# Patient Record
Sex: FEMALE | Race: ASIAN | NOT HISPANIC OR LATINO | Employment: UNEMPLOYED | URBAN - METROPOLITAN AREA
[De-identification: names, ages, dates, MRNs, and addresses within clinical notes are randomized per-mention and may not be internally consistent; named-entity substitution may affect disease eponyms.]

---

## 2021-07-12 ENCOUNTER — HOSPITAL ENCOUNTER (EMERGENCY)
Facility: HOSPITAL | Age: 56
Discharge: HOME | End: 2021-07-12
Attending: STUDENT IN AN ORGANIZED HEALTH CARE EDUCATION/TRAINING PROGRAM | Admitting: STUDENT IN AN ORGANIZED HEALTH CARE EDUCATION/TRAINING PROGRAM
Payer: COMMERCIAL

## 2021-07-12 ENCOUNTER — HOSPITAL ENCOUNTER (EMERGENCY)
Facility: HOSPITAL | Age: 56
End: 2021-07-12

## 2021-07-12 VITALS
TEMPERATURE: 98.3 F | DIASTOLIC BLOOD PRESSURE: 99 MMHG | RESPIRATION RATE: 16 BRPM | WEIGHT: 150 LBS | SYSTOLIC BLOOD PRESSURE: 177 MMHG | HEART RATE: 68 BPM | OXYGEN SATURATION: 99 %

## 2021-07-12 DIAGNOSIS — E87.6 HYPOKALEMIA: ICD-10-CM

## 2021-07-12 DIAGNOSIS — K29.00 ACUTE GASTRITIS WITHOUT HEMORRHAGE, UNSPECIFIED GASTRITIS TYPE: ICD-10-CM

## 2021-07-12 LAB
ALBUMIN SERPL-MCNC: 4.1 G/DL (ref 3.5–5)
ALP SERPL-CCNC: 95 U/L (ref 45–120)
ALT SERPL W P-5'-P-CCNC: 25 U/L (ref 0–45)
ANION GAP SERPL CALCULATED.3IONS-SCNC: 8 MMOL/L (ref 5–18)
AST SERPL W P-5'-P-CCNC: 23 U/L (ref 0–40)
BASOPHILS # BLD AUTO: 0.1 10E3/UL (ref 0–0.2)
BASOPHILS NFR BLD AUTO: 1 %
BILIRUB SERPL-MCNC: 0.7 MG/DL (ref 0–1)
BUN SERPL-MCNC: 18 MG/DL (ref 8–22)
CALCIUM SERPL-MCNC: 9.7 MG/DL (ref 8.5–10.5)
CHLORIDE BLD-SCNC: 105 MMOL/L (ref 98–107)
CO2 SERPL-SCNC: 28 MMOL/L (ref 22–31)
CREAT SERPL-MCNC: 0.81 MG/DL (ref 0.6–1.1)
EOSINOPHIL # BLD AUTO: 0.2 10E3/UL (ref 0–0.7)
EOSINOPHIL NFR BLD AUTO: 2 %
ERYTHROCYTE [DISTWIDTH] IN BLOOD BY AUTOMATED COUNT: 12.4 % (ref 10–15)
GFR SERPL CREATININE-BSD FRML MDRD: 81 ML/MIN/1.73M2
GLUCOSE BLD-MCNC: 93 MG/DL (ref 70–125)
HCT VFR BLD AUTO: 40.8 % (ref 35–47)
HGB BLD-MCNC: 13.2 G/DL (ref 11.7–15.7)
IMM GRANULOCYTES # BLD: 0 10E3/UL
IMM GRANULOCYTES NFR BLD: 0 %
LIPASE SERPL-CCNC: 22 U/L (ref 0–52)
LYMPHOCYTES # BLD AUTO: 1.8 10E3/UL (ref 0.8–5.3)
LYMPHOCYTES NFR BLD AUTO: 23 %
MAGNESIUM SERPL-MCNC: 2 MG/DL (ref 1.8–2.6)
MCH RBC QN AUTO: 27.7 PG (ref 26.5–33)
MCHC RBC AUTO-ENTMCNC: 32.4 G/DL (ref 31.5–36.5)
MCV RBC AUTO: 86 FL (ref 78–100)
MONOCYTES # BLD AUTO: 0.5 10E3/UL (ref 0–1.3)
MONOCYTES NFR BLD AUTO: 6 %
NEUTROPHILS # BLD AUTO: 5.3 10E3/UL (ref 1.6–8.3)
NEUTROPHILS NFR BLD AUTO: 68 %
NRBC # BLD AUTO: 0 10E3/UL
NRBC BLD AUTO-RTO: 0 /100
PLATELET # BLD AUTO: 219 10E3/UL (ref 150–450)
POTASSIUM BLD-SCNC: 3 MMOL/L (ref 3.5–5)
PROT SERPL-MCNC: 7.8 G/DL (ref 6–8)
RBC # BLD AUTO: 4.77 10E6/UL (ref 3.8–5.2)
SODIUM SERPL-SCNC: 141 MMOL/L (ref 136–145)
TROPONIN I SERPL-MCNC: <0.01 NG/ML (ref 0–0.29)
WBC # BLD AUTO: 7.8 10E3/UL (ref 4–11)

## 2021-07-12 PROCEDURE — 99284 EMERGENCY DEPT VISIT MOD MDM: CPT

## 2021-07-12 PROCEDURE — 36592 COLLECT BLOOD FROM PICC: CPT | Performed by: EMERGENCY MEDICINE

## 2021-07-12 PROCEDURE — 83735 ASSAY OF MAGNESIUM: CPT | Performed by: EMERGENCY MEDICINE

## 2021-07-12 PROCEDURE — 83690 ASSAY OF LIPASE: CPT | Performed by: EMERGENCY MEDICINE

## 2021-07-12 PROCEDURE — 85025 COMPLETE CBC W/AUTO DIFF WBC: CPT | Performed by: EMERGENCY MEDICINE

## 2021-07-12 PROCEDURE — 93005 ELECTROCARDIOGRAM TRACING: CPT | Performed by: STUDENT IN AN ORGANIZED HEALTH CARE EDUCATION/TRAINING PROGRAM

## 2021-07-12 PROCEDURE — 80053 COMPREHEN METABOLIC PANEL: CPT | Performed by: EMERGENCY MEDICINE

## 2021-07-12 PROCEDURE — 250N000013 HC RX MED GY IP 250 OP 250 PS 637: Performed by: STUDENT IN AN ORGANIZED HEALTH CARE EDUCATION/TRAINING PROGRAM

## 2021-07-12 PROCEDURE — 84484 ASSAY OF TROPONIN QUANT: CPT | Performed by: STUDENT IN AN ORGANIZED HEALTH CARE EDUCATION/TRAINING PROGRAM

## 2021-07-12 PROCEDURE — 250N000009 HC RX 250: Performed by: STUDENT IN AN ORGANIZED HEALTH CARE EDUCATION/TRAINING PROGRAM

## 2021-07-12 RX ORDER — FAMOTIDINE 20 MG/1
20 TABLET, FILM COATED ORAL 2 TIMES DAILY
Qty: 60 TABLET | Refills: 0 | Status: SHIPPED | OUTPATIENT
Start: 2021-07-12 | End: 2021-08-11

## 2021-07-12 RX ADMIN — LIDOCAINE HYDROCHLORIDE 30 ML: 20 SOLUTION ORAL; TOPICAL at 14:52

## 2021-07-12 ASSESSMENT — ENCOUNTER SYMPTOMS
NAUSEA: 0
SHORTNESS OF BREATH: 0
BLOOD IN STOOL: 0
ABDOMINAL PAIN: 1
VOMITING: 0

## 2021-07-12 NOTE — ED NOTES
Patient is resting comfortably in bed.  She states that her pain is gone now and she feels good.  Call light is within reach as she awaits provider reevaluation.

## 2021-07-12 NOTE — ED TRIAGE NOTES
"Patient presents here for evaluation of upper midline abdominal pain that has persisted for the past week. She describes it as a \"cooling sense\". The pain is worse with palpation.   "

## 2021-07-12 NOTE — DISCHARGE INSTRUCTIONS
You can also use Maalox as needed which is available over the counter. Your potassium was a little low today. You should eat some potassium containing foods such as bananas.     Return to the ED for severe pain, vomiting, fevers, or any other new/concerning symptoms.

## 2021-07-12 NOTE — ED NOTES
"Pt reports constant  epigastric pain x 8 days. BP elevated , pt states that she \"forgot\" to take her BP meds x 2 days.  "

## 2021-07-12 NOTE — ED PROVIDER NOTES
"  Emergency Department Encounter     Evaluation Date & Time:   2021  2:03 PM    CHIEF COMPLAINT:  Abdominal Pain      Triage Note:Patient presents here for evaluation of upper midline abdominal pain that has persisted for the past week. She describes it as a \"cooling sense\". The pain is worse with palpation.     Impression and Plan     ED COURSE & MEDICAL DECISION MAKIN year old old female who presents to the ED for evaluation of upper abdominal pain.   History and physical examination as documented. Vital signs reassuring.  Patient has pain in the epigastrium.  She has no focal right upper quadrant pain or tenderness.  Her abdomen is completely nontender and there is no indication for CT imaging.  Labs were ordered in triage.  Consideration for gastritis/PUD, pancreatitis, cholecystitis/cholelithiasis/choledocholithiasis, ACS, among other etiologies.  Her labs are all reassuring.  Lipase is normal.  LFTs with no obstructive pattern. Troponin is normal. EKG with no ST elevation or depression. I don't think this is ACS. She had complete relief with GI cocktail and would like to be discharged.  Presentation is consistent with gastritis. We will start her on Pepcid and advise Maalox as needed. Given f/u with [facility]. Given return precautions and discharged in stable condition.     Additionally has mild hypokalemia at 3.0. I advised eating potassium containing foods.    2:26 PM  I introduced myself to the patient.   3:39 PM I reassessed the patient. Pain is gone after GI cocktail.     At the conclusion of the encounter I discussed the results of all the tests and the disposition. The questions were answered. The patient or family acknowledged understanding and was agreeable with the care plan.    FINAL IMPRESSION:    ICD-10-CM    1. Acute gastritis without hemorrhage, unspecified gastritis type  K29.00    2. Hypokalemia  E87.6        0 minutes of critical care time    Reassessments & Consults       MEDICATIONS " GIVEN IN THE EMERGENCY DEPARTMENT:  Medications   lidocaine (XYLOCAINE) 2 % 15 mL, alum & mag hydroxide-simethicone (MAALOX) 15 mL GI Cocktail (30 mLs Oral Given 7/12/21 1452)       NEW PRESCRIPTIONS STARTED AT TODAY'S ED VISIT:  New Prescriptions    FAMOTIDINE (PEPCID) 20 MG TABLET    Take 1 tablet (20 mg) by mouth 2 times daily       HPI     HPI     Anat Coreas is a 56 year old female with no pertinent history who presents to this ED via walk-in for evaluation of chest pain.    Patient was interpreted by daughter. Language: Hmong     Patient reports upper midline abdominal pain started seven to eight days ago. Pain has been constant. Any pressure on her upper mid abdomen provokes the pain. Patient reports a history of high blood pressure. Patient has not taken blood pressure medication for the past two days.     Patient reports bloating. Patient denies any nausea, vomiting, irregular bowel movements, any urinary symptoms, blood in stool, difficulty breathing, and any chest pain.   Patient has had no history of this pain. Patient has no history of heart attacks, diabetes, or stomach ulcers.     No other complaints at this time    REVIEW OF SYSTEMS:  Review of Systems   Respiratory: Negative for shortness of breath.    Cardiovascular: Negative for chest pain.   Gastrointestinal: Positive for abdominal pain (Upper mid abdomen). Negative for blood in stool, nausea and vomiting.        Positive for bloating. Negative for irregular bowel movements.    Genitourinary: Negative.    All other systems reviewed and are negative.    Medical History     History of hypertension  No past abdominal surgeries  No pertinent family history.     Non-smoker  No frequent alcohol use.    Physical Exam     First Vitals:  Patient Vitals for the past 24 hrs:   BP Temp Pulse Resp SpO2 Weight   07/12/21 1620 (!) 177/99 -- 68 16 99 % --   07/12/21 1532 (!) 187/108 -- 61 20 98 % --   07/12/21 1416 (!) 200/95 -- 59 16 99 % --   07/12/21 1202 (!)  197/110 98.3  F (36.8  C) 67 15 99 % 68 kg (150 lb)       PHYSICAL EXAM:   General: NAD.  HEENT: No external signs of trauma. No scleral icterus. Oropharynx clear and moist. Regular.  Chest/Pulm: No tenderness to palpation. Lungs clear to auscultation bilaterally.  CV: Regular rate and rhythm without murmurs.  Abdomen: Soft and non-distended without tenderness to palpation.  MSK/Extremities: Actively moving all four extremities. No pedal edema.  Skin: No visible rashes  Neuro: Alert and conversant.   Psych: Behavior normal.    Results     LAB:  All pertinent labs reviewed and interpreted  Labs Ordered and Resulted from Time of ED Arrival Up to the Time of Departure from the ED   COMPREHENSIVE METABOLIC PANEL - Abnormal; Notable for the following components:       Result Value    Potassium 3.0 (*)     All other components within normal limits   LIPASE - Normal   MAGNESIUM - Normal   TROPONIN I - Normal   CBC WITH PLATELETS AND DIFFERENTIAL   CBC WITH PLATELETS & DIFFERENTIAL    Narrative:     The following orders were created for panel order CBC with platelets differential.  Procedure                               Abnormality         Status                     ---------                               -----------         ------                     CBC with platelets and d...[[id number]]                      Final result                 Please view results for these tests on the individual orders.       RADIOLOGY:  No orders to display              ECG:  Performed at: [facility]     Impression: EKG shows sinus rhythm of a normal rate. No ST elevation or depression. Normal intervals.     Rate: 57 BPM  Rhythm: Normal Rate  IA Interval: 216 ms  QRS Interval: 98 ms  QTc Interval: 460/447 ms      I have independently reviewed and interpreted the EKS(s) documented above    PROCEDURES:  None    [facility] System Documentation     Supriya Chang MD  Emergency Medicine  [facility] EMERGENCY  DEPARTMENT       Supriya Chang MD  07/12/21 5864

## 2021-07-13 LAB — INTERPRETATION ECG - MUSE: NORMAL

## 2022-06-10 ENCOUNTER — HOSPITAL ENCOUNTER (EMERGENCY)
Facility: HOSPITAL | Age: 57
Discharge: HOME | End: 2022-06-10
Admitting: PHYSICIAN ASSISTANT
Payer: COMMERCIAL

## 2022-06-10 ENCOUNTER — APPOINTMENT (OUTPATIENT)
Dept: RADIOLOGY | Facility: HOSPITAL | Age: 57
End: 2022-06-10
Attending: EMERGENCY MEDICINE
Payer: COMMERCIAL

## 2022-06-10 ENCOUNTER — APPOINTMENT (OUTPATIENT)
Dept: CT IMAGING | Facility: HOSPITAL | Age: 57
End: 2022-06-10
Payer: COMMERCIAL

## 2022-06-10 VITALS
RESPIRATION RATE: 16 BRPM | TEMPERATURE: 98 F | DIASTOLIC BLOOD PRESSURE: 95 MMHG | SYSTOLIC BLOOD PRESSURE: 165 MMHG | OXYGEN SATURATION: 97 % | HEART RATE: 69 BPM

## 2022-06-10 DIAGNOSIS — R31.9 HEMATURIA: ICD-10-CM

## 2022-06-10 DIAGNOSIS — N20.0 NEPHROLITHIASIS: ICD-10-CM

## 2022-06-10 DIAGNOSIS — R03.0 ELEVATED BLOOD PRESSURE READING WITHOUT DIAGNOSIS OF HYPERTENSION: ICD-10-CM

## 2022-06-10 DIAGNOSIS — M62.838 MUSCLE SPASM: ICD-10-CM

## 2022-06-10 DIAGNOSIS — M54.50 BILATERAL LOW BACK PAIN WITHOUT SCIATICA: ICD-10-CM

## 2022-06-10 LAB
ALBUMIN UR-MCNC: 50 MG/DL
APPEARANCE UR: CLEAR
BILIRUB UR QL STRIP: NEGATIVE
CAOX CRY #/AREA URNS HPF: ABNORMAL /HPF
COLOR UR AUTO: YELLOW
GLUCOSE UR STRIP-MCNC: NEGATIVE MG/DL
HGB UR QL STRIP: ABNORMAL
HYALINE CASTS: 3 /LPF
KETONES UR STRIP-MCNC: NEGATIVE MG/DL
LEUKOCYTE ESTERASE UR QL STRIP: ABNORMAL
MUCOUS THREADS #/AREA URNS LPF: PRESENT /LPF
NITRATE UR QL: NEGATIVE
PH UR STRIP: 6 [PH] (ref 5–7)
RBC URINE: 7 /HPF
SP GR UR STRIP: 1.02 (ref 1–1.03)
SQUAMOUS EPITHELIAL: 2 /HPF
UROBILINOGEN UR STRIP-MCNC: <2 MG/DL
WBC URINE: 3 /HPF

## 2022-06-10 PROCEDURE — 81001 URINALYSIS AUTO W/SCOPE: CPT | Performed by: EMERGENCY MEDICINE

## 2022-06-10 PROCEDURE — 74176 CT ABD & PELVIS W/O CONTRAST: CPT

## 2022-06-10 PROCEDURE — 72100 X-RAY EXAM L-S SPINE 2/3 VWS: CPT

## 2022-06-10 PROCEDURE — 99285 EMERGENCY DEPT VISIT HI MDM: CPT | Mod: 25

## 2022-06-10 RX ORDER — KETOROLAC TROMETHAMINE 30 MG/ML
15 INJECTION, SOLUTION INTRAMUSCULAR; INTRAVENOUS ONCE
Status: DISCONTINUED | OUTPATIENT
Start: 2022-06-10 | End: 2022-06-10 | Stop reason: HOSPADM

## 2022-06-10 RX ORDER — CYCLOBENZAPRINE HCL 10 MG
10 TABLET ORAL 3 TIMES DAILY PRN
Qty: 15 TABLET | Refills: 0 | Status: SHIPPED | OUTPATIENT
Start: 2022-06-10

## 2022-06-10 RX ORDER — CYCLOBENZAPRINE HCL 10 MG
10 TABLET ORAL 3 TIMES DAILY PRN
Qty: 15 TABLET | Refills: 0 | Status: SHIPPED | OUTPATIENT
Start: 2022-06-10 | End: 2022-06-10

## 2022-06-10 ASSESSMENT — ENCOUNTER SYMPTOMS
FLANK PAIN: 0
ABDOMINAL PAIN: 1
HEMATURIA: 0
NAUSEA: 1
CONSTIPATION: 0
FREQUENCY: 0
DYSURIA: 0
DIFFICULTY URINATING: 0
BACK PAIN: 1
DIARRHEA: 0

## 2022-06-10 NOTE — ED NOTES
ED Provider In Triage Note  [facility]  Encounter Date: Jairo 10, 2022    Chief Complaint   Patient presents with     Back Pain       Brief HPI:   Anat Coreas is a 57 year old female presenting to the Emergency Department with a chief complaint of LBP/R flank pain for 8-10 days. No dysuria. No injuries. Has not taken anything for pain  Brief Physical Exam:  BP (!) 180/99   Pulse 77   Temp 98  F (36.7  C) (Temporal)   Resp 16   SpO2 98%   General: Non-toxic appearing  HEENT: Atraumatic  Resp: No respiratory distress  Abdomen: Non-peritoneal. Mild R CVAT and mid-line upper lumbar tenderness  Neuro: Alert, oriented, answers questions appropriately  Psych: Behavior appropriate    Plan Initiated in Triage:  Orders Placed This Encounter     Lumbar spine XR, 2-3 views     UA with Microscopic reflex to Culture       PIT Dispo:   Return to lobby while awaiting workup and ED bed availability    Scottie Frank MD on 6/10/2022 at 12:55 PM    Patient was evaluated by the Physician in Triage due to a limitation of available rooms in the Emergency Department. A plan of care was discussed based on the information obtained on the initial evaluation and patient was consuled to return back to the Emergency Department lobby after this initial evalutaiton until results were obtained or a room became available in the Emergency Department. Patient was counseled not to leave prior to receiving the results of their workup.     Scottie Frank MD  [facility] EMERGENCY DEPARTMENT  [address] [place] [id number]  [phone]     Scottie Frank MD  06/10/22 9409

## 2022-06-10 NOTE — ED PROVIDER NOTES
EMERGENCY DEPARTMENT ENCOUNTER      NAME: Anat Coreas  AGE: 57 year old female  YOB: 1965  MRN: [id number]  EVALUATION DATE & TIME: No admission date for patient encounter.    PCP: System, Provider Not In    ED PROVIDER: Nicki Kang PA-C      Chief Complaint   Patient presents with     Back Pain         FINAL IMPRESSION:  1. Bilateral low back pain without sciatica    2. Hematuria    3. Elevated blood pressure reading without diagnosis of hypertension    4. Nephrolithiasis    5. Muscle spasm          ED COURSE & MEDICAL DECISION MAKIN:33 PM I introduced myself to patient, performed initial HPI and examination.     Anat Coreas is a 57 year old female who presents to the emergency department today for evaluation of back pain. Reports pain x 10 days, progressively worsening. Bilateral low back without radiation to her lower legs. No associated urinary symptoms, bowel changes. Did report some urge incontinence intermittently (cannot make it completely to the bathroom/becomes incontinent just before getting to the toilet). Otherwise no red flag symptoms for cord impingement/cauda equina. Ambulatory without assistive device, comfortable appearing.     Differential diagnosis for back pain includes muscle spasm/strain, slipped disc w/ radicular pain including sciatica, slipped disc w/ cauda equina syndrome,vertebral fracture, vertebral tumor, epidural abscess / discitis, or pyelonephritis.      I do not believe a fracture to be the source of this patient's pain as there was no preceding trauma.  XR WNL.     I do not believe the pain is caused by an epidural abscess as the patient denies hx of IV drug use and does not have fevers/chills and no recent procedures.      I do not believe this patient's pain is from an infiltrative vertebral tumor as the patient does not have weight loss or night sweats and no known history of cancer.      I do not believe this patient's pain represents a rupture AAA.   There is no palpable, pulsatile mass on exam and patient does not have any ABD pain.      Patient do not have urinary symptoms or CVA tenderness to suggest pyelonephritis.  Urinalysis shows RBC raising suspicion for ureterolithiasis. CT shows 2 tiny stones right mid kidney, small stone in left kidney but no ureterolithiasis. Hepatomegaly incidentally noted but exam otherwise WNL.    Presentation is most consistent with muscle spasm. Could have passed a stone recently which would explain patient's hematuria, but story is not convincing. Encouraged at home supportive management and close follow up with PCP for re-check. Instructed on red flags/indications to return to the emergency department. Patient discharged in stable and ambulatory condition.       MEDICATIONS GIVEN IN THE EMERGENCY:  Medications   ketorolac (TORADOL) injection 15 mg (has no administration in time range)       NEW PRESCRIPTIONS STARTED AT TODAY'S ER VISIT  New Prescriptions    CYCLOBENZAPRINE (FLEXERIL) 10 MG TABLET    Take 1 tablet (10 mg) by mouth 3 times daily as needed for muscle spasms          =================================================================    HPI    Patient information was obtained from: Patient     Use of : No; Patient refuses, family interprets - Language Marely Coreas is a 57 year old female with a pertinent history of asthma, depression, hyperlipidemia, prediabetes, and hypertension who presents to this ED via walk-in for evaluation of back pain. Patient reports constant back pain on both sides for the past ten days. Her pain is getting progressively worse. She has tried Tylenol and ibuprofen but they only help so often. She notes some loss of bladder control as she can't make it to the bathroom in time, nausea, as well as upper chronic abdominal pain. Denies urinary symptoms, history of kidney stones, diarrhea, constipation, leg pain.       REVIEW OF SYSTEMS   Review of Systems    Gastrointestinal: Positive for abdominal pain and nausea. Negative for constipation and diarrhea.   Genitourinary: Negative for decreased urine volume, difficulty urinating, dysuria, flank pain, frequency, hematuria and urgency.   Musculoskeletal: Positive for back pain.   All other systems reviewed and are negative.     PAST MEDICAL HISTORY:  No past medical history on file.    PAST SURGICAL HISTORY:  No past surgical history on file.    CURRENT MEDICATIONS:    cyclobenzaprine (FLEXERIL) 10 MG tablet        ALLERGIES:  No Known Allergies    FAMILY HISTORY:  No family history on file.    SOCIAL HISTORY:   Social History     Socioeconomic History     Marital status:        VITALS:  BP (!) 180/99   Pulse 77   Temp 98  F (36.7  C) (Temporal)   Resp 16   SpO2 98%     PHYSICAL EXAM    Constitutional: Well developed, Well nourished, NAD, GCS 15   HENT: Normocephalic, Atraumatic.   Neck- Supple, Nontender. Normal ROM.   Eyes: Conjunctiva normal. PERRL.   Respiratory: No respiratory distress, speaking in full sentences. Normal breath sounds, No wheezing  Cardiovascular: Normal heart rate, Regular rhythm, No murmurs.  Pulses intact.   GI: Soft, nontender. No distention or masses. No CVA tenderness.    Musculoskeletal: No deformities, Moves all extremities equally. No midline thoracic or lumbar spine tenderness. Patient localizes pain to low thoracic/upper lumbar paraspinal musculature but no reproducible tenderness. Ambulatory without assistive devices.   Integument: Warm, Dry, No erythema, ecchymosis, or rash.  Neurologic: Alert & oriented x 3, Normal sensory function. No focal deficits.   Psychiatric: Affect normal, Judgment normal, Mood normal. Cooperative.      LAB:  All pertinent labs reviewed and interpreted.  Results for orders placed or performed during the hospital encounter of 06/10/22   Lumbar spine XR, 2-3 views    Impression    IMPRESSION: 5 lumbar type vertebrae. Normal alignment. Vertebral body  heights normal. No fractures. Probable facet arthropathy at L4-L5 and L5-S1. No other significant degenerative change.              Abd/pelvis CT no contrast - Stone Protocol    Impression    IMPRESSION:   1.  Tiny nonobstructing stones both kidneys.  2.  Bladder is unremarkable.  3.  Hepatomegaly.     UA with Microscopic reflex to Culture    Specimen: Urine, Clean Catch   Result Value Ref Range    Color Urine Yellow Colorless, Straw, Light Yellow, Yellow    Appearance Urine Clear Clear    Glucose Urine Negative Negative mg/dL    Bilirubin Urine Negative Negative    Ketones Urine Negative Negative mg/dL    Specific Gravity Urine 1.023 1.001 - 1.030    Blood Urine 1.0 mg/dL (A) Negative    pH Urine 6.0 5.0 - 7.0    Protein Albumin Urine 50  (A) Negative mg/dL    Urobilinogen Urine <2.0 <2.0 mg/dL    Nitrite Urine Negative Negative    Leukocyte Esterase Urine 75 Reyes/uL (A) Negative    Mucus Urine Present (A) None Seen /LPF    Calcium Oxalate Crystals Urine Few (A) None Seen /HPF    RBC Urine 7 (H) <=2 /HPF    WBC Urine 3 <=5 /HPF    Squamous Epithelials Urine 2 (H) <=1 /HPF    Hyaline Casts Urine 3 (H) <=2 /LPF       RADIOLOGY:  Reviewed all pertinent imaging. Please see official radiology report.  Abd/pelvis CT no contrast - Stone Protocol   Final Result   IMPRESSION:    1.  Tiny nonobstructing stones both kidneys.   2.  Bladder is unremarkable.   3.  Hepatomegaly.         Lumbar spine XR, 2-3 views   Final Result   IMPRESSION: 5 lumbar type vertebrae. Normal alignment. Vertebral body heights normal. No fractures. Probable facet arthropathy at L4-L5 and L5-S1. No other significant degenerative change.                         EKG:  None    PROCEDURES: None      I, Coby Bartholomew, am serving as a scribe to document services personally performed by Nicki Kang PA-C based on my observation and the provider's statements to me. I, Nicki Kang PA-C, attest that Coby Bartholomwe is acting in a scribe capacity,  has observed my performance of the services and has documented them in accordance with my direction.    Nicki Kang PA-C  Emergency Medicine  [facility] EMERGENCY DEPARTMENT  [address] [place] [id number] [phone]             Nicki Kang PA-C  06/10/22 8093

## 2022-06-10 NOTE — DISCHARGE INSTRUCTIONS
CT shows you have kidney stones IN the kidneys - this would not be causing your symptoms.  Your back pain is likely muscle spasm.    Use ibuprofen 600 mg and tylenol 650 mg every 6 hours as needed for pain.  You can use ice and heat for pain (whichever feels better)  You can use lidocaine patches/cream (or tiger balm) for pain.  You can use flexeril 10 mg 3 times daily as needed for muscle spasm (prescribed).    Follow up in clinic on Monday for re-check of your symptoms and your hematuria.  Return to the emergency department if you develop fevers, worsening/changing back pain, loss of bowel/bladder control, new weakness, abdominal pain, or any other concerning symptoms. We would be happy to see you.

## 2022-06-10 NOTE — ED TRIAGE NOTES
The pt reports mid to lower back pain that goes into her left flank x10 days. Denies chest pain or SOB.      Triage Assessment     Row Name 06/10/22 1235       Triage Assessment (Adult)    Airway WDL WDL       Respiratory WDL    Respiratory WDL WDL       Peripheral/Neurovascular WDL    Peripheral Neurovascular WDL WDL               interest in learning

## 2024-07-03 ENCOUNTER — MEDICAL CORRESPONDENCE (OUTPATIENT)
Dept: HEALTH INFORMATION MANAGEMENT | Facility: CLINIC | Age: 59
End: 2024-07-03
Payer: COMMERCIAL

## 2024-07-08 ENCOUNTER — TRANSCRIBE ORDERS (OUTPATIENT)
Dept: OTHER | Age: 59
End: 2024-07-08

## 2024-07-08 DIAGNOSIS — E87.6 HYPOKALEMIA: Primary | ICD-10-CM

## 2024-07-12 ENCOUNTER — TELEPHONE (OUTPATIENT)
Dept: ENDOCRINOLOGY | Facility: CLINIC | Age: 59
End: 2024-07-12
Payer: COMMERCIAL

## 2024-07-12 NOTE — TELEPHONE ENCOUNTER
Left Voicemail (1st Attempt) for the patient to call back and schedule the following:    Appointment type: New Endocrine   Provider: Izabella England Ruanpeng, or Anthony   Return date: next avail   Specialty phone number: [phone]  Additional appointment(s) needed: NA   Additonal Notes: LVM, No MyC x1  Carolee Dailey MD  [id number]  Endocrine triage  schedule first available endocrine appointment. Preferred Provider Izabella England Ruanpeng, Harindhanavudhi Elizabeth J Dixon on 7/12/2024 at 11:01 AM

## 2024-07-19 ENCOUNTER — HOSPITAL ENCOUNTER (EMERGENCY)
Facility: HOSPITAL | Age: 59
Discharge: HOME | End: 2024-07-19
Attending: EMERGENCY MEDICINE | Admitting: EMERGENCY MEDICINE
Payer: COMMERCIAL

## 2024-07-19 VITALS
BODY MASS INDEX: 26.31 KG/M2 | TEMPERATURE: 98.9 F | HEIGHT: 62 IN | HEART RATE: 77 BPM | WEIGHT: 143 LBS | OXYGEN SATURATION: 98 % | RESPIRATION RATE: 16 BRPM | SYSTOLIC BLOOD PRESSURE: 145 MMHG | DIASTOLIC BLOOD PRESSURE: 73 MMHG

## 2024-07-19 DIAGNOSIS — M54.50 CHRONIC BILATERAL LOW BACK PAIN WITHOUT SCIATICA: ICD-10-CM

## 2024-07-19 DIAGNOSIS — G89.29 CHRONIC BILATERAL LOW BACK PAIN WITHOUT SCIATICA: ICD-10-CM

## 2024-07-19 LAB
ALBUMIN SERPL BCG-MCNC: 4.3 G/DL (ref 3.5–5.2)
ALBUMIN UR-MCNC: 70 MG/DL
ALP SERPL-CCNC: 83 U/L (ref 40–150)
ALT SERPL W P-5'-P-CCNC: 22 U/L (ref 0–50)
ANION GAP SERPL CALCULATED.3IONS-SCNC: 13 MMOL/L (ref 7–15)
APPEARANCE UR: CLEAR
AST SERPL W P-5'-P-CCNC: 23 U/L (ref 0–45)
BACTERIA #/AREA URNS HPF: ABNORMAL /HPF
BASOPHILS # BLD AUTO: 0 10E3/UL (ref 0–0.2)
BASOPHILS NFR BLD AUTO: 0 %
BILIRUB SERPL-MCNC: 0.4 MG/DL
BILIRUB UR QL STRIP: NEGATIVE
BUN SERPL-MCNC: 19.5 MG/DL (ref 8–23)
CALCIUM SERPL-MCNC: 9.3 MG/DL (ref 8.8–10.4)
CHLORIDE SERPL-SCNC: 100 MMOL/L (ref 98–107)
COLOR UR AUTO: ABNORMAL
CREAT SERPL-MCNC: 0.99 MG/DL (ref 0.51–0.95)
EGFRCR SERPLBLD CKD-EPI 2021: 65 ML/MIN/1.73M2
EOSINOPHIL # BLD AUTO: 0.2 10E3/UL (ref 0–0.7)
EOSINOPHIL NFR BLD AUTO: 2 %
ERYTHROCYTE [DISTWIDTH] IN BLOOD BY AUTOMATED COUNT: 12.5 % (ref 10–15)
GLUCOSE BLDC GLUCOMTR-MCNC: 93 MG/DL (ref 70–99)
GLUCOSE SERPL-MCNC: 101 MG/DL (ref 70–99)
GLUCOSE UR STRIP-MCNC: NEGATIVE MG/DL
HCO3 SERPL-SCNC: 28 MMOL/L (ref 22–29)
HCT VFR BLD AUTO: 40.2 % (ref 35–47)
HGB BLD-MCNC: 13.2 G/DL (ref 11.7–15.7)
HGB UR QL STRIP: ABNORMAL
HOLD SPECIMEN: NORMAL
IMM GRANULOCYTES # BLD: 0 10E3/UL
IMM GRANULOCYTES NFR BLD: 0 %
KETONES UR STRIP-MCNC: NEGATIVE MG/DL
LEUKOCYTE ESTERASE UR QL STRIP: ABNORMAL
LYMPHOCYTES # BLD AUTO: 1.4 10E3/UL (ref 0.8–5.3)
LYMPHOCYTES NFR BLD AUTO: 13 %
MAGNESIUM SERPL-MCNC: 1.9 MG/DL (ref 1.7–2.3)
MCH RBC QN AUTO: 27.7 PG (ref 26.5–33)
MCHC RBC AUTO-ENTMCNC: 32.8 G/DL (ref 31.5–36.5)
MCV RBC AUTO: 84 FL (ref 78–100)
MONOCYTES # BLD AUTO: 0.7 10E3/UL (ref 0–1.3)
MONOCYTES NFR BLD AUTO: 6 %
NEUTROPHILS # BLD AUTO: 8.2 10E3/UL (ref 1.6–8.3)
NEUTROPHILS NFR BLD AUTO: 78 %
NITRATE UR QL: NEGATIVE
NRBC # BLD AUTO: 0 10E3/UL
NRBC BLD AUTO-RTO: 0 /100
PH UR STRIP: 6.5 [PH] (ref 5–7)
PLATELET # BLD AUTO: 228 10E3/UL (ref 150–450)
POTASSIUM SERPL-SCNC: 3.3 MMOL/L (ref 3.4–5.3)
PROT SERPL-MCNC: 7.8 G/DL (ref 6.4–8.3)
RBC # BLD AUTO: 4.77 10E6/UL (ref 3.8–5.2)
RBC URINE: 25 /HPF
SODIUM SERPL-SCNC: 141 MMOL/L (ref 135–145)
SP GR UR STRIP: 1.01 (ref 1–1.03)
SQUAMOUS EPITHELIAL: 1 /HPF
TROPONIN T SERPL HS-MCNC: <6 NG/L
TSH SERPL DL<=0.005 MIU/L-ACNC: 0.64 UIU/ML (ref 0.3–4.2)
UROBILINOGEN UR STRIP-MCNC: <2 MG/DL
WBC # BLD AUTO: 10.6 10E3/UL (ref 4–11)
WBC URINE: 4 /HPF

## 2024-07-19 PROCEDURE — 82962 GLUCOSE BLOOD TEST: CPT

## 2024-07-19 PROCEDURE — 80053 COMPREHEN METABOLIC PANEL: CPT | Performed by: EMERGENCY MEDICINE

## 2024-07-19 PROCEDURE — 85004 AUTOMATED DIFF WBC COUNT: CPT | Performed by: STUDENT IN AN ORGANIZED HEALTH CARE EDUCATION/TRAINING PROGRAM

## 2024-07-19 PROCEDURE — 93005 ELECTROCARDIOGRAM TRACING: CPT | Performed by: EMERGENCY MEDICINE

## 2024-07-19 PROCEDURE — 84075 ASSAY ALKALINE PHOSPHATASE: CPT | Performed by: STUDENT IN AN ORGANIZED HEALTH CARE EDUCATION/TRAINING PROGRAM

## 2024-07-19 PROCEDURE — 250N000013 HC RX MED GY IP 250 OP 250 PS 637: Performed by: EMERGENCY MEDICINE

## 2024-07-19 PROCEDURE — 99284 EMERGENCY DEPT VISIT MOD MDM: CPT

## 2024-07-19 PROCEDURE — 83735 ASSAY OF MAGNESIUM: CPT | Performed by: EMERGENCY MEDICINE

## 2024-07-19 PROCEDURE — 85025 COMPLETE CBC W/AUTO DIFF WBC: CPT | Performed by: EMERGENCY MEDICINE

## 2024-07-19 PROCEDURE — 84443 ASSAY THYROID STIM HORMONE: CPT | Performed by: EMERGENCY MEDICINE

## 2024-07-19 PROCEDURE — 84484 ASSAY OF TROPONIN QUANT: CPT | Performed by: EMERGENCY MEDICINE

## 2024-07-19 PROCEDURE — 36415 COLL VENOUS BLD VENIPUNCTURE: CPT | Performed by: STUDENT IN AN ORGANIZED HEALTH CARE EDUCATION/TRAINING PROGRAM

## 2024-07-19 PROCEDURE — 81001 URINALYSIS AUTO W/SCOPE: CPT | Performed by: EMERGENCY MEDICINE

## 2024-07-19 RX ORDER — CYCLOBENZAPRINE HCL 10 MG
10 TABLET ORAL 3 TIMES DAILY PRN
Qty: 20 TABLET | Refills: 0 | Status: SHIPPED | OUTPATIENT
Start: 2024-07-19 | End: 2024-07-25

## 2024-07-19 RX ORDER — ACETAMINOPHEN 325 MG/1
650 TABLET ORAL ONCE
Status: COMPLETED | OUTPATIENT
Start: 2024-07-19 | End: 2024-07-19

## 2024-07-19 RX ADMIN — ACETAMINOPHEN 650 MG: 325 TABLET ORAL at 16:56

## 2024-07-19 ASSESSMENT — ACTIVITIES OF DAILY LIVING (ADL)
ADLS_ACUITY_SCORE: 35
ADLS_ACUITY_SCORE: 33

## 2024-07-19 ASSESSMENT — COLUMBIA-SUICIDE SEVERITY RATING SCALE - C-SSRS
6. HAVE YOU EVER DONE ANYTHING, STARTED TO DO ANYTHING, OR PREPARED TO DO ANYTHING TO END YOUR LIFE?: NO
2. HAVE YOU ACTUALLY HAD ANY THOUGHTS OF KILLING YOURSELF IN THE PAST MONTH?: NO
1. IN THE PAST MONTH, HAVE YOU WISHED YOU WERE DEAD OR WISHED YOU COULD GO TO SLEEP AND NOT WAKE UP?: NO

## 2024-07-19 NOTE — ED TRIAGE NOTES
Patient reports that she has been experiencing increasing weakness and fatigue over the past month. She notes increased urinary out put as well. Blood glucose in triage is 93.     Triage Assessment (Adult)       Row Name 07/19/24 1595          Triage Assessment    Airway WDL WDL        Respiratory WDL    Respiratory WDL WDL        Skin Circulation/Temperature WDL    Skin Circulation/Temperature WDL WDL        Cardiac WDL    Cardiac WDL WDL        Peripheral/Neurovascular WDL    Peripheral Neurovascular WDL WDL        Cognitive/Neuro/Behavioral WDL    Cognitive/Neuro/Behavioral WDL WDL

## 2024-07-19 NOTE — ED PROVIDER NOTES
EMERGENCY DEPARTMENT ENCOUNTER     NAME: Anat Coreas   AGE: 59 year old female   YOB: 1965   MRN: [id number]   EVALUATION DATE & TIME: 7/19/2024  3:02 PM   PCP: Marcelina Lamar     Chief Complaint   Patient presents with    Generalized Weakness   :    FINAL IMPRESSION       1. Chronic bilateral low back pain without sciatica           ED COURSE & MEDICAL DECISION MAKING      Pertinent Labs & Imaging studies reviewed. (See chart for details)   59 year old female  presents to the Emergency Department for evaluation of ongoing issues with bilateral lower back pain and fatigue.  Patient and family notes that she has had several years of back pain, and I do see that last visit in our emergency department for this was about 2 years ago.  She has no new symptoms but notes that for at least the last month she has felt more fatigued and more pain.  She is not taking any medications for this. Initial Vitals Reviewed. Initial exam notable for ill-appearing patient who is comfortable, has intact strength and sensation, negative straight leg raise and I do not think this represents sciatica.  She does not appear to be describing a radiculopathy.  She has had several workups and with years of symptoms I do not think this represents something like AAA or kidney stones so I do not think she needs new imaging today.  Urinalysis has a few white blood cells but she has no urinary symptoms, no fever or complaints of flank pain, and while I will send this for culture I do not think this explains her bilateral pain for several years.  I am going to give her some Flexeril for home.  I do not think she needs emergent MRI as she has no red flags for cauda equina syndrome or epidural abscess but I am going to place a referral to the spine center for her to be seen for this ongoing issue.  Patient and family are comfortable with this plan at time of discharge.        3:06 PM I met with the patient, obtained history, performed an  initial exam, and discussed options and plan for diagnostics and treatment here in the ED.      At the conclusion of the encounter I discussed the results of all of the tests and the disposition. The questions were answered. The patient or family acknowledged understanding and was agreeable with the care plan.     0 minutes critical care time, see procedure note below for details if relevant    Medical Decision Making    History:  Supplemental history from: Documented in chart, family members  External Record(s) reviewed: Documented in chart, ED visit 6/10/2022    Work Up:  Chart documentation includes differential considered and any EKGs or imaging independently interpreted by provider, where specified.  In additional to work up documented, I considered the following work up: Documented in chart, if applicable.    External consultation:  Discussion of management with another provider: Documented in chart, if applicable    Complicating factors:  Care impacted by chronic illness: Chronic Lung Disease, Chronic Pain, Hyperlipidemia, Hypertension, and Mental Health  Care affected by social determinants of health: Access to Medical Care    Disposition considerations: Discharge. I prescribed additional prescription strength medication(s) as charted. I considered admission, but ultimately discharged patient with reassuring workup and exam and referral plan.        MEDICATIONS GIVEN IN THE EMERGENCY:   Medications - No data to display   NEW PRESCRIPTIONS STARTED AT TODAY'S ER VISIT   New Prescriptions    CYCLOBENZAPRINE (FLEXERIL) 10 MG TABLET    Take 1 tablet (10 mg) by mouth 3 times daily as needed for muscle spasms     ================================================================   HISTORY OF PRESENT ILLNESS       Patient information was obtained from: patient and her family member.   Use of Intrepreter: Yes, Family Member - Language Marely Coreas is a 59 year old female with history of prediabetes, muscle pain,  "hyperlipidemia, hypertension, and depressive disorder who presents with back pain.      Per patient and her family member, she has had chronic back pain for over two years and the past month it has been getting worse and today it is the most painful it has been. Patient describes a lack of strength, \"feels like she has no energy\", and \"feels like she's going to fall\". Patient states she's had no injury to provoke these symptoms. Patient states that she has taken no prescription medication to alleviate the symptoms.   Patient denies any incontinence.    Per chart review, 06/10/2022 [facility] Emergency Department: Patient reported constant back pain on both sides for the past ten days. Her pain was getting progressively worse. She had tried Tylenol and ibuprofen but they only helped so often. She noted some loss of bladder control as she couldn't make it to the bathroom in time, nausea, as well as upper chronic abdominal pain.    ================================================================        PAST HISTORY     PAST MEDICAL HISTORY:   No past medical history on file.   PAST SURGICAL HISTORY:   No past surgical history on file.   CURRENT MEDICATIONS:   cyclobenzaprine (FLEXERIL) 10 MG tablet      ALLERGIES:   No Known Allergies   FAMILY HISTORY:   No family history on file.   SOCIAL HISTORY:   Social History     Socioeconomic History    Marital status:         VITALS  Patient Vitals for the past 24 hrs:   BP Temp Temp src Pulse Resp SpO2 Height Weight   07/19/24 1335 136/85 98.9  F (37.2  C) Oral 89 16 98 % 1.575 m (5' 2\") 64.9 kg (143 lb)        ================================================================    PHYSICAL EXAM     VITAL SIGNS: /85   Pulse 89   Temp 98.9  F (37.2  C) (Oral)   Resp 16   Ht 1.575 m (5' 2\")   Wt 64.9 kg (143 lb)   SpO2 98%   BMI 26.16 kg/m     Constitutional:  Awake, no acute distress   HENT:  Atraumatic, oropharynx without exudate or erythema, membranes " moist  Lymph:  No adenopathy  Eyes: EOM intact, PERRL, no injection  Neck: Supple  Respiratory:  Clear to auscultation bilaterally, no wheezes or crackles   Cardiovascular:  Regular rate and rhythm, single S1 and S2   GI:  Soft, nontender, nondistended, no rebound or guarding   Musculoskeletal:  Moves all extremities, no lower extremity edema, no deformities    Skin:  Warm, dry  Neurologic:  Alert and oriented x3, no focal deficits noted. Strength and sensation intact in lower extremities. Negative straight leg raise.      ================================================================  LAB       All pertinent labs reviewed and interpreted.   Labs Ordered and Resulted from Time of ED Arrival to Time of ED Departure   COMPREHENSIVE METABOLIC PANEL - Abnormal       Result Value    Sodium 141      Potassium 3.3 (*)     Carbon Dioxide (CO2) 28      Anion Gap 13      Urea Nitrogen 19.5      Creatinine 0.99 (*)     GFR Estimate 65      Calcium 9.3      Chloride 100      Glucose 101 (*)     Alkaline Phosphatase 83      AST 23      ALT 22      Protein Total 7.8      Albumin 4.3      Bilirubin Total 0.4     GLUCOSE BY METER - Normal    GLUCOSE BY METER POCT 93     MAGNESIUM - Normal    Magnesium 1.9     TSH WITH FREE T4 REFLEX - Normal    TSH 0.64     TROPONIN T, HIGH SENSITIVITY - Normal    Troponin T, High Sensitivity <6     CBC WITH PLATELETS AND DIFFERENTIAL    WBC Count 10.6      RBC Count 4.77      Hemoglobin 13.2      Hematocrit 40.2      MCV 84      MCH 27.7      MCHC 32.8      RDW 12.5      Platelet Count 228      % Neutrophils 78      % Lymphocytes 13      % Monocytes 6      % Eosinophils 2      % Basophils 0      % Immature Granulocytes 0      NRBCs per 100 WBC 0      Absolute Neutrophils 8.2      Absolute Lymphocytes 1.4      Absolute Monocytes 0.7      Absolute Eosinophils 0.2      Absolute Basophils 0.0      Absolute Immature Granulocytes 0.0      Absolute NRBCs 0.0     ROUTINE UA WITH MICROSCOPIC REFLEX TO  CULTURE        ===============================================================  RADIOLOGY       Reviewed all pertinent imaging. Please see official radiology report.   No orders to display         ================================================================  EKG     EKG reviewed interpreted by me shows sinus rhythm with rate of 77, normal axis, QTc 470 with no acute ST or T wave changes since July 2021    I have independently reviewed and interpreted the EKG(s) documented above.     ================================================================  PROCEDURES         I, Adams Mcdaniel, am serving as a scribe to document services personally performed by Dr. Bernardo based on my observation and the provider's statements to me. I, Bhakti Bernardo MD attest that Adams Mcdaniel is acting in a scribe capacity, has observed my performance of the services and has documented them in accordance with my direction.   Bhakti Bernardo M.D.   Emergency Medicine   [facility] EMERGENCY DEPARTMENT  [address] [place] [id number]  [phone]  Dept: [phone]       Bhakti Bernardo MD  07/19/24 9758

## 2024-07-23 LAB
ATRIAL RATE - MUSE: 77 BPM
DIASTOLIC BLOOD PRESSURE - MUSE: NORMAL MMHG
INTERPRETATION ECG - MUSE: NORMAL
P AXIS - MUSE: 36 DEGREES
PR INTERVAL - MUSE: 198 MS
QRS DURATION - MUSE: 102 MS
QT - MUSE: 416 MS
QTC - MUSE: 470 MS
R AXIS - MUSE: 56 DEGREES
SYSTOLIC BLOOD PRESSURE - MUSE: NORMAL MMHG
T AXIS - MUSE: 41 DEGREES
VENTRICULAR RATE- MUSE: 77 BPM

## 2024-07-26 NOTE — TELEPHONE ENCOUNTER
REASON FOR VISIT: Chronic bilateral low back pain without sciatica    DATE OF APPT: 8/30/2024   NOTES (FOR ALL VISITS) STATUS DETAILS   OFFICE NOTE from referring provider Internal [facility]  Bhakti Bernardo MD   MEDICATION LIST Internal    IMAGING  (FOR ALL VISITS)     ULTRASOUND (CAROTID BILAT) *VASCULAR* N/A    MRI (HEAD, NECK, SPINE) N/A    CT (HEAD, NECK, SPINE) N/A

## 2024-08-30 ENCOUNTER — ANCILLARY PROCEDURE (OUTPATIENT)
Dept: GENERAL RADIOLOGY | Facility: CLINIC | Age: 59
End: 2024-08-30
Attending: PHYSICAL MEDICINE & REHABILITATION
Payer: COMMERCIAL

## 2024-08-30 ENCOUNTER — OFFICE VISIT (OUTPATIENT)
Dept: NEUROSURGERY | Facility: CLINIC | Age: 59
End: 2024-08-30
Attending: EMERGENCY MEDICINE
Payer: COMMERCIAL

## 2024-08-30 ENCOUNTER — PRE VISIT (OUTPATIENT)
Dept: NEUROSURGERY | Facility: CLINIC | Age: 59
End: 2024-08-30

## 2024-08-30 VITALS
BODY MASS INDEX: 26.31 KG/M2 | HEART RATE: 74 BPM | HEIGHT: 62 IN | DIASTOLIC BLOOD PRESSURE: 104 MMHG | WEIGHT: 143 LBS | SYSTOLIC BLOOD PRESSURE: 177 MMHG

## 2024-08-30 DIAGNOSIS — M47.816 LUMBAR SPONDYLOSIS: ICD-10-CM

## 2024-08-30 DIAGNOSIS — G89.29 CHRONIC BILATERAL LOW BACK PAIN WITHOUT SCIATICA: ICD-10-CM

## 2024-08-30 DIAGNOSIS — M54.50 CHRONIC BILATERAL LOW BACK PAIN WITHOUT SCIATICA: ICD-10-CM

## 2024-08-30 DIAGNOSIS — M47.816 LUMBAR SPONDYLOSIS: Primary | ICD-10-CM

## 2024-08-30 DIAGNOSIS — M79.18 MYOFASCIAL PAIN: ICD-10-CM

## 2024-08-30 DIAGNOSIS — M62.838 MUSCLE SPASM: ICD-10-CM

## 2024-08-30 PROCEDURE — T1013 SIGN LANG/ORAL INTERPRETER: HCPCS | Mod: U4

## 2024-08-30 PROCEDURE — 73522 X-RAY EXAM HIPS BI 3-4 VIEWS: CPT | Performed by: STUDENT IN AN ORGANIZED HEALTH CARE EDUCATION/TRAINING PROGRAM

## 2024-08-30 PROCEDURE — 72100 X-RAY EXAM L-S SPINE 2/3 VWS: CPT | Performed by: RADIOLOGY

## 2024-08-30 PROCEDURE — 99204 OFFICE O/P NEW MOD 45 MIN: CPT | Performed by: PHYSICAL MEDICINE & REHABILITATION

## 2024-08-30 RX ORDER — QUETIAPINE FUMARATE 50 MG/1
TABLET, EXTENDED RELEASE ORAL
COMMUNITY

## 2024-08-30 RX ORDER — CHLORHEXIDINE GLUCONATE ORAL RINSE 1.2 MG/ML
SOLUTION DENTAL
COMMUNITY
Start: 2024-07-22

## 2024-08-30 RX ORDER — CHOLECALCIFEROL (VITAMIN D3) 10 MCG
CAPSULE ORAL
COMMUNITY
Start: 2024-05-09

## 2024-08-30 RX ORDER — AMLODIPINE AND BENAZEPRIL HYDROCHLORIDE 10; 40 MG/1; MG/1
CAPSULE ORAL
COMMUNITY

## 2024-08-30 RX ORDER — ALBUTEROL SULFATE 90 UG/1
AEROSOL, METERED RESPIRATORY (INHALATION)
COMMUNITY

## 2024-08-30 RX ORDER — ERGOCALCIFEROL 1.25 MG/1
CAPSULE ORAL
COMMUNITY

## 2024-08-30 RX ORDER — DILTIAZEM HYDROCHLORIDE 60 MG/1
TABLET, FILM COATED ORAL
COMMUNITY
Start: 2024-06-03

## 2024-08-30 RX ORDER — ATORVASTATIN CALCIUM 20 MG/1
TABLET, FILM COATED ORAL
COMMUNITY

## 2024-08-30 RX ORDER — POTASSIUM CHLORIDE 1500 MG/1
TABLET, EXTENDED RELEASE ORAL
COMMUNITY
Start: 2024-07-03

## 2024-08-30 RX ORDER — ACETAMINOPHEN 500 MG
TABLET ORAL
COMMUNITY

## 2024-08-30 RX ORDER — FAMOTIDINE 20 MG/1
TABLET, FILM COATED ORAL
COMMUNITY

## 2024-08-30 NOTE — LETTER
"8/30/2024      Anat Coreas  [address] [place] [address]      Dear Colleague,    Thank you for referring your patient, Anat Coreas, to the [facility] [place]. Please see a copy of my visit note below.    Anat Coreas's goals for this visit include:   Chief Complaint   Patient presents with     New Patient     Chronic bilateral low back pain without sciatica//ucare/no imgs/spine  cons  Please use iPad or phones [phone] to reach  and follow prompts           She requests these members of her care team be copied on today's visit information: yes    PCP: Marcelina Lamar    Referring Provider:  Bhakti Bernardo MD  [address] [place] [address]    BP (!) 177/104 (BP Location: Right arm, Patient Position: Sitting, Cuff Size: Adult Regular)   Pulse 74   Ht 1.575 m (5' 2\")   Wt 64.9 kg (143 lb)   BMI 26.16 kg/m      Do you need any medication refills at today's visit? No  BART Galvan, [facility] ([facility])        Language: [employer] video interpretor used.     HISTORY OF PRESENT ILLNESS:  Anat Coreas is a 59 year old female who presents with a chief complaint of low back pain.  She is accompanied by her 2 adult daughters.    Pain began +20yrs ago and is associated after having her 5th child.   The pain is localized to the lumbar spine extending down to the tailbone; equal right and left; denies radicular pain; she reports infrequent numbness/tingling in the feet; described as constant  in nature. Pain is reported as 7/10 at rest today and up to 7/10 at worst in the last week. Symptoms are worse with any activity - whether standing, walking, exercise; and improved with massage with ointment. She does report pain-related sleep disturbance.     PRIOR INJURIES/TREATMENT:   Ice/Heat: +  Physical Therapy:   Prior PT last summer in [place].       - Current Pain Medications -   Tylenol  Topical patch?    - Prior/Trialed Pain Medications -   Muscle relaxants: " "Cyclobenzaprine    Prior Procedures:  Date    Procedure   Improvement (%)  None              Prior Related Surgery: None   Other (acupuncture, OMT, CMM, TENS, DME, etc.):   None    Specialists Seen - (with most recent, available notes and clinic visits reviewed)      IMAGING - reviewed   06/10/22 - XR Lumbar spine  IMPRESSION: 5 lumbar type vertebrae. Normal alignment. Vertebral body heights normal. No fractures. Probable facet arthropathy at L4-L5 and L5-S1. No other significant degenerative change.     Review Of Systems:  I am responding to those symptoms which are directly relevant to the specific indication for my consultation. I recommend that the patient follow up with their primary or referring provider to pursue any other symptoms which may be of concern.       Medical History:  She has history of depression asthma, carpal tunnel syndrome, pre-diabetes, HTN, HLD, chronic migraine/headache.       Family History  Her family history is not on file.     Social History:  Work: Not employed.   Current living situation: Lives in [place]  She          Current Medications:   She has a current medication list which includes the following prescription(s): acetaminophen, amlodipine-benazepril, atorvastatin, chlorhexidine, cyclobenzaprine, eql vitamin d3, ergocalciferol, famotidine, goodsense aspirin low dose, omeprazole, potassium chloride hayder er, quetiapine, sertraline, symbicort, and ventolin hfa.     Allergies:   No Known Allergies    PHYSICAL EXAMINATION:  BP (!) 177/104 (BP Location: Right arm, Patient Position: Sitting, Cuff Size: Adult Regular)   Pulse 74   Ht 1.575 m (5' 2\")   Wt 64.9 kg (143 lb)   BMI 26.16 kg/m     General: Pleasant, straightforward, WDWN individual.  Mental Status: Pleasant, direct, appropriate mood and affect  Resp: breathing is unlabored without audible wheeze  Vascular: No visible cyanosis, no venous stasis changes  Heme: No visible ecchymosis or erythema on extremities  Skin: " No notable rash    Neurologic:  Strength: All major muscle groups of the bilateral lower extremities have normal and symmetric muscle strength     Sensation: SILT in lower extremities bilaterally L3-S1     DTRs: bilateral lower extremity stretch reflexes are equal and symmetric     Gait: reveals normal connor and stride     Musculoskeletal:  Lumbar Spine: Mod reduced ROM all planes, tender to palpation lumbar paraspinal muscles with more pronounced muscle fullness and tenderness at the mid to upper region; increased thoracolumbar kyphosis, facet loading with pain bilaterally, SI joint maneuvers negative. Str Leg Raise pos, hip provocative maneuvers negative.          ASSESSMENT:  Anat Coreas is a pleasant 59 year old female who presents with:    #. Lumbar spondylosis  (primary encounter diagnosis)  #. Chronic bilateral low back pain without sciatica  #. Myofascial pain  #. Muscle spasm        PLAN:  -X-ray pelvis/hips and lumbar spine today  -PT referral  -Rx: Tizanidine at 2 to 4 mg as needed for muscle pain and spasm  - RTC x 4 to 6 weeks after start of PT    Ready to learn, no apparent learning barriers.  Education provided on treatment plan according to patient's preferred learning style.  Patient verbalizes understanding.   __________________________________  Georgette Ornelas MD  Physical Medicine & Rehabilitation               Again, thank you for allowing me to participate in the care of your patient.        Sincerely,        Georgette Ornelas MD

## 2024-08-30 NOTE — PROGRESS NOTES
"Anat Coreas's goals for this visit include:   Chief Complaint   Patient presents with    New Patient     Chronic bilateral low back pain without sciatica//ucare/no imgs/spine  cons  Please use iPad or phones [phone] to reach  and follow prompts           She requests these members of her care team be copied on today's visit information: yes    PCP: Marcelina Lamar    Referring Provider:  Bhakti Bernardo MD  [address] [place] [address]    BP (!) 177/104 (BP Location: Right arm, Patient Position: Sitting, Cuff Size: Adult Regular)   Pulse 74   Ht 1.575 m (5' 2\")   Wt 64.9 kg (143 lb)   BMI 26.16 kg/m      Do you need any medication refills at today's visit? No  BART Galvan, [facility] ([facility])      "

## 2024-08-30 NOTE — PATIENT INSTRUCTIONS
It was a pleasure seeing you today in our [facility]. We discussed the following:    #. X-ray: Lumbar spine, pelvis/hips    An XR order was added today. You may schedule this at the discharge desk or radiology will call you to schedule. You may also call [facility] Imaging Scheduling directly if you do not hear from them in the next couple of days.    Imaging scheduling  -[facility] [phone] Clinics and Surgery Center [facility] ([facility]), [facility] Clinics, including [facility], [facility]  -[facility] [phone] [facility], [facility] Clinics including [place], [place], [place], [place], and [place]  -[facility] [phone] [facility], [facility], [facility], [facility] clinics, including [place], [facility], and [facility]  -[facility] [phone] [facility], [facility], [facility] Clinics, including [place], [facility], and [place]      #. Physical Therapy referral    An order for physical therapy was added today. Physical therapy schedulers should call you in the next few days to schedule an appointment. You may also call [phone] to arrange an appointment at any of the [facility] outpatient physical therapy locations.  It will be very important for you to do the physical therapy exercises on a regular basis to decrease your pain and prevent future flares of pain.     #. A muscle relaxer was prescribed for you today Tizanadine (Xanaflex) as needed for pain    Follow up after about 4 weeks of therapy

## 2024-08-30 NOTE — PROGRESS NOTES
Language: [employer] video interpretor used.     HISTORY OF PRESENT ILLNESS:  Anat Coreas is a 59 year old female who presents with a chief complaint of low back pain.  She is accompanied by her 2 adult daughters.    Pain began +20yrs ago and is associated after having her 5th child.   The pain is localized to the lumbar spine extending down to the tailbone; equal right and left; denies radicular pain; she reports infrequent numbness/tingling in the feet; described as constant  in nature. Pain is reported as 7/10 at rest today and up to 7/10 at worst in the last week. Symptoms are worse with any activity - whether standing, walking, exercise; and improved with massage with ointment. She does report pain-related sleep disturbance.     PRIOR INJURIES/TREATMENT:   Ice/Heat: +  Physical Therapy:   Prior PT last summer in [place].       - Current Pain Medications -   Tylenol  Topical patch?    - Prior/Trialed Pain Medications -   Muscle relaxants: Cyclobenzaprine    Prior Procedures:  Date    Procedure   Improvement (%)  None              Prior Related Surgery: None   Other (acupuncture, OMT, CMM, TENS, DME, etc.):   None    Specialists Seen - (with most recent, available notes and clinic visits reviewed)      IMAGING - reviewed   06/10/22 - XR Lumbar spine  IMPRESSION: 5 lumbar type vertebrae. Normal alignment. Vertebral body heights normal. No fractures. Probable facet arthropathy at L4-L5 and L5-S1. No other significant degenerative change.     Review Of Systems:  I am responding to those symptoms which are directly relevant to the specific indication for my consultation. I recommend that the patient follow up with their primary or referring provider to pursue any other symptoms which may be of concern.       Medical History:  She has history of depression asthma, carpal tunnel syndrome, pre-diabetes, HTN, HLD, chronic migraine/headache.       Family History  Her family history is not on file.     Social History:  Work:  "Not employed.   Current living situation: Lives in [place]  She          Current Medications:   She has a current medication list which includes the following prescription(s): acetaminophen, amlodipine-benazepril, atorvastatin, chlorhexidine, cyclobenzaprine, eql vitamin d3, ergocalciferol, famotidine, goodsense aspirin low dose, omeprazole, potassium chloride hayder er, quetiapine, sertraline, symbicort, and ventolin hfa.     Allergies:   No Known Allergies    PHYSICAL EXAMINATION:  BP (!) 177/104 (BP Location: Right arm, Patient Position: Sitting, Cuff Size: Adult Regular)   Pulse 74   Ht 1.575 m (5' 2\")   Wt 64.9 kg (143 lb)   BMI 26.16 kg/m     General: Pleasant, straightforward, WDWN individual.  Mental Status: Pleasant, direct, appropriate mood and affect  Resp: breathing is unlabored without audible wheeze  Vascular: No visible cyanosis, no venous stasis changes  Heme: No visible ecchymosis or erythema on extremities  Skin: No notable rash    Neurologic:  Strength: All major muscle groups of the bilateral lower extremities have normal and symmetric muscle strength     Sensation: SILT in lower extremities bilaterally L3-S1     DTRs: bilateral lower extremity stretch reflexes are equal and symmetric     Gait: reveals normal connor and stride     Musculoskeletal:  Lumbar Spine: Mod reduced ROM all planes, tender to palpation lumbar paraspinal muscles with more pronounced muscle fullness and tenderness at the mid to upper region; increased thoracolumbar kyphosis, facet loading with pain bilaterally, SI joint maneuvers negative. Str Leg Raise pos, hip provocative maneuvers negative.          ASSESSMENT:  Anat Coreas is a pleasant 59 year old female who presents with:    #. Lumbar spondylosis  (primary encounter diagnosis)  #. Chronic bilateral low back pain without sciatica  #. Myofascial pain  #. Muscle spasm        PLAN:  -X-ray pelvis/hips and lumbar spine today  -PT referral  -Rx: Tizanidine at 2 to 4 " mg as needed for muscle pain and spasm  - RTC x 4 to 6 weeks after start of PT    Ready to learn, no apparent learning barriers.  Education provided on treatment plan according to patient's preferred learning style.  Patient verbalizes understanding.   __________________________________  Georgette Ornelas MD  Physical Medicine & Rehabilitation

## 2024-09-10 ENCOUNTER — VIRTUAL VISIT (OUTPATIENT)
Dept: INTERPRETER SERVICES | Facility: CLINIC | Age: 59
End: 2024-09-10
Payer: COMMERCIAL

## 2024-09-10 ENCOUNTER — HOSPITAL ENCOUNTER (EMERGENCY)
Facility: HOSPITAL | Age: 59
Discharge: HOME | End: 2024-09-10
Attending: EMERGENCY MEDICINE | Admitting: EMERGENCY MEDICINE
Payer: COMMERCIAL

## 2024-09-10 VITALS
TEMPERATURE: 97.3 F | WEIGHT: 151.4 LBS | RESPIRATION RATE: 16 BRPM | DIASTOLIC BLOOD PRESSURE: 89 MMHG | OXYGEN SATURATION: 97 % | BODY MASS INDEX: 27.69 KG/M2 | SYSTOLIC BLOOD PRESSURE: 151 MMHG | HEART RATE: 57 BPM

## 2024-09-10 DIAGNOSIS — R53.83 OTHER FATIGUE: ICD-10-CM

## 2024-09-10 LAB
ANION GAP SERPL CALCULATED.3IONS-SCNC: 11 MMOL/L (ref 7–15)
BASOPHILS # BLD AUTO: 0.1 10E3/UL (ref 0–0.2)
BASOPHILS NFR BLD AUTO: 1 %
BUN SERPL-MCNC: 24.1 MG/DL (ref 8–23)
CALCIUM SERPL-MCNC: 9.3 MG/DL (ref 8.8–10.4)
CHLORIDE SERPL-SCNC: 103 MMOL/L (ref 98–107)
CREAT SERPL-MCNC: 1.13 MG/DL (ref 0.51–0.95)
EGFRCR SERPLBLD CKD-EPI 2021: 55 ML/MIN/1.73M2
EOSINOPHIL # BLD AUTO: 0.3 10E3/UL (ref 0–0.7)
EOSINOPHIL NFR BLD AUTO: 4 %
ERYTHROCYTE [DISTWIDTH] IN BLOOD BY AUTOMATED COUNT: 12.6 % (ref 10–15)
FLUAV RNA SPEC QL NAA+PROBE: NEGATIVE
FLUBV RNA RESP QL NAA+PROBE: NEGATIVE
GLUCOSE SERPL-MCNC: 104 MG/DL (ref 70–99)
HCO3 SERPL-SCNC: 26 MMOL/L (ref 22–29)
HCT VFR BLD AUTO: 35.6 % (ref 35–47)
HGB BLD-MCNC: 11.8 G/DL (ref 11.7–15.7)
HOLD SPECIMEN: NORMAL
HOLD SPECIMEN: NORMAL
IMM GRANULOCYTES # BLD: 0 10E3/UL
IMM GRANULOCYTES NFR BLD: 0 %
LYMPHOCYTES # BLD AUTO: 1.7 10E3/UL (ref 0.8–5.3)
LYMPHOCYTES NFR BLD AUTO: 23 %
MAGNESIUM SERPL-MCNC: 2.6 MG/DL (ref 1.7–2.3)
MCH RBC QN AUTO: 28.2 PG (ref 26.5–33)
MCHC RBC AUTO-ENTMCNC: 33.1 G/DL (ref 31.5–36.5)
MCV RBC AUTO: 85 FL (ref 78–100)
MONOCYTES # BLD AUTO: 0.5 10E3/UL (ref 0–1.3)
MONOCYTES NFR BLD AUTO: 7 %
NEUTROPHILS # BLD AUTO: 4.7 10E3/UL (ref 1.6–8.3)
NEUTROPHILS NFR BLD AUTO: 65 %
NRBC # BLD AUTO: 0 10E3/UL
NRBC BLD AUTO-RTO: 0 /100
PLATELET # BLD AUTO: 206 10E3/UL (ref 150–450)
POTASSIUM SERPL-SCNC: 3.7 MMOL/L (ref 3.4–5.3)
RBC # BLD AUTO: 4.18 10E6/UL (ref 3.8–5.2)
RSV RNA SPEC NAA+PROBE: NEGATIVE
SARS-COV-2 RNA RESP QL NAA+PROBE: NEGATIVE
SODIUM SERPL-SCNC: 140 MMOL/L (ref 135–145)
TSH SERPL DL<=0.005 MIU/L-ACNC: 2 UIU/ML (ref 0.3–4.2)
WBC # BLD AUTO: 7.3 10E3/UL (ref 4–11)

## 2024-09-10 PROCEDURE — T1013 SIGN LANG/ORAL INTERPRETER: HCPCS | Mod: U4,TEL,95

## 2024-09-10 PROCEDURE — 93005 ELECTROCARDIOGRAM TRACING: CPT | Performed by: EMERGENCY MEDICINE

## 2024-09-10 PROCEDURE — 87637 SARSCOV2&INF A&B&RSV AMP PRB: CPT | Performed by: EMERGENCY MEDICINE

## 2024-09-10 PROCEDURE — 85025 COMPLETE CBC W/AUTO DIFF WBC: CPT | Performed by: EMERGENCY MEDICINE

## 2024-09-10 PROCEDURE — 93005 ELECTROCARDIOGRAM TRACING: CPT | Performed by: STUDENT IN AN ORGANIZED HEALTH CARE EDUCATION/TRAINING PROGRAM

## 2024-09-10 PROCEDURE — 99284 EMERGENCY DEPT VISIT MOD MDM: CPT

## 2024-09-10 PROCEDURE — 84443 ASSAY THYROID STIM HORMONE: CPT | Performed by: EMERGENCY MEDICINE

## 2024-09-10 PROCEDURE — 80048 BASIC METABOLIC PNL TOTAL CA: CPT | Performed by: EMERGENCY MEDICINE

## 2024-09-10 PROCEDURE — 83735 ASSAY OF MAGNESIUM: CPT | Performed by: EMERGENCY MEDICINE

## 2024-09-10 PROCEDURE — 36415 COLL VENOUS BLD VENIPUNCTURE: CPT | Performed by: EMERGENCY MEDICINE

## 2024-09-10 ASSESSMENT — COLUMBIA-SUICIDE SEVERITY RATING SCALE - C-SSRS
1. IN THE PAST MONTH, HAVE YOU WISHED YOU WERE DEAD OR WISHED YOU COULD GO TO SLEEP AND NOT WAKE UP?: NO
6. HAVE YOU EVER DONE ANYTHING, STARTED TO DO ANYTHING, OR PREPARED TO DO ANYTHING TO END YOUR LIFE?: NO
2. HAVE YOU ACTUALLY HAD ANY THOUGHTS OF KILLING YOURSELF IN THE PAST MONTH?: NO

## 2024-09-10 ASSESSMENT — ACTIVITIES OF DAILY LIVING (ADL)
ADLS_ACUITY_SCORE: 33
ADLS_ACUITY_SCORE: 35

## 2024-09-10 NOTE — ED PROVIDER NOTES
"EMERGENCY DEPARTMENT ENCOUNTER      NAME: Anat Coreas  AGE: 59 year old female  YOB: 1965  MRN: [id number]  EVALUATION DATE & TIME: 9/10/2024  4:00 PM    PCP: Marcelina Lamar    ED PROVIDER: Laura Deras M.D.      Chief Complaint   Patient presents with    Dizziness    Headache         FINAL IMPRESSION:  1. Other fatigue          ED COURSE & MEDICAL DECISION MAKING:    ED Course as of 09/10/24 1751 Tue Sep 10, 2024   1610 Patient with a few days feeling intermittently lightheaded without syncope. No chest pain or palpitations or SOB or cough or fever or abdominal symptoms, and she notes most recently she felt more unwell/lightheaded was 2pm (2h ago) and feeling well now except \"slightly more run down\", normal VS and no headache currently, no rash, no meningismus, no AMS to suggest meningoencephalitis. Patient ok with plan to check TSH, electrolytes, CBC and viral PCR and clinically reassess, possibly may relate to her newly starting muscle relaxant therapy for her chronic back pain. She is open to check labs and plan to reassess,  also ok with plan, [place] [facility] [place]   1725 BMP and CBC reassuring with normal magnesium   1740 TSH WNL   1747 Viral PCR reassuringly negative. Pt with fatigue nonspecific with normal VS and no current vertigo or lightheadedness, will trial stopping her muscle relaxer. Patient discharged after being provided with extensive anticipatory guidance and given return precautions, importance of PMD follow-up emphasized.        Pertinent Labs & Imaging studies reviewed. (See chart for details)    Medical Decision Making  Obtained supplemental history:Supplemental history obtained?: Documented in chart and Family Member/Significant Other  Reviewed external records: External records reviewed?: No  Care impacted by chronic illness:N/A  Care significantly affected by social determinants of health:N/A  Did you consider but not order tests?: Work up considered but " "not performed and documented in chart, if applicable  Did you interpret images independently?: Independent interpretation of ECG and images noted in documentation, when applicable.  Consultation discussion with other provider:Did you involve another provider (consultant, , pharmacy, etc.)?: No  Discharge. I recommended discontinuing prescription strength medication(s) as charted. I considered admission, but discharged patient after significant clinical improvement.  Not Applicable      At the conclusion of the encounter I discussed the results of all of the tests and the disposition. The questions were answered. The patient or family acknowledged understanding and was agreeable with the care plan.     MEDICATIONS GIVEN IN THE EMERGENCY:  Medications - No data to display    NEW PRESCRIPTIONS STARTED AT TODAY'S ER VISIT  New Prescriptions    No medications on file          =================================================================    HPI      Anat Coreas is a 59 year old female with PMHx of hypertension, chronic back pain who presents to the ED today via walk in with lightheadedness. [employer] phone interpretor used. Patient endorses 3 days of 2-3 daily episodes of lightheadedness, headache, and head pressure. The most recent episode was at around 1400 earlier today. Patient currently endorses fatigue, feeling \"run down\", and having \"low energy\". Patient notes four days ago she started taking a new muscle relaxer, she denies any other new medications.  Patient denies similar symptoms in the past.     Patient denies recalling a history of anemia or low blood levels. Patient denies recent sicknesses. Patient denies chest pain, palpitations, shortness of breath, cough, fever, abdominal pain, runny nose, diarrhea, vomiting, or any other symptoms at this time.        REVIEW OF SYSTEMS   All other systems reviewed and are negative except as noted above in HPI.    PAST MEDICAL HISTORY:  No past medical history on " file.    PAST SURGICAL HISTORY:  No past surgical history on file.    CURRENT MEDICATIONS:    acetaminophen (TYLENOL) 500 MG tablet  amLODIPine-benazepril (LOTREL) 10-40 MG capsule  atorvastatin (LIPITOR) 20 MG tablet  chlorhexidine (PERIDEX) 0.12 % solution  cyclobenzaprine (FLEXERIL) 10 MG tablet  EQL VITAMIN D3 50 MCG (2000 UT) CAPS  ergocalciferol (ERGOCALCIFEROL) 1.25 MG (66689 UT) capsule  famotidine (PEPCID) 20 MG tablet  GOODSENSE ASPIRIN LOW DOSE 81 MG EC tablet  omeprazole (PRILOSEC) 20 MG DR capsule  potassium chloride hayder ER (KLOR-CON M20) 20 MEQ CR tablet  QUEtiapine (SEROQUEL XR) 50 MG TB24 24 hr tablet  sertraline (ZOLOFT) 50 MG tablet  SYMBICORT 80-4.5 MCG/ACT Inhaler  tiZANidine (ZANAFLEX) 4 MG tablet  VENTOLIN  (90 Base) MCG/ACT inhaler        ALLERGIES:  No Known Allergies    FAMILY HISTORY:  No family history on file.    SOCIAL HISTORY:   Social History     Socioeconomic History    Marital status:        VITALS:  Patient Vitals for the past 24 hrs:   BP Temp Temp src Pulse Resp SpO2 Weight   09/10/24 1745 (!) 151/89 -- -- 57 -- 97 % --   09/10/24 1730 135/80 -- -- 58 -- 96 % --   09/10/24 1715 136/79 -- -- 62 -- 98 % --   09/10/24 1700 122/78 -- -- 59 -- 97 % --   09/10/24 1645 123/74 -- -- 59 -- 97 % --   09/10/24 1531 117/70 97.3  F (36.3  C) Tympanic 64 16 97 % 68.7 kg (151 lb 6.4 oz)       PHYSICAL EXAM    GENERAL: Awake, alert.  In no acute distress.   HEENT: Normocephalic, atraumatic.  Pupils equal, round and reactive.  Conjunctiva normal.  EOMI.  NECK: No stridor or apparent deformity.  PULMONARY: Symmetrical breath sounds without distress.  Lungs clear to auscultation bilaterally without wheezes, rhonchi or rales.  CARDIO: Regular rate and rhythm.  No significant murmur, rub or gallop.  Radial pulses strong and symmetrical.  ABDOMINAL: Abdomen soft, non-distended and non-tender to palpation.  No CVAT, no palpable hepatosplenomegaly.  EXTREMITIES: No lower extremity swelling  or edema.    NEURO: Alert and oriented to person, place and time.  Cranial nerves grossly intact.  No focal motor deficit.  PSYCH: Normal mood and affect  SKIN: No rashes      LAB:  All pertinent labs reviewed and interpreted.  Results for orders placed or performed during the hospital encounter of 09/10/24   Basic metabolic panel   Result Value Ref Range    Sodium 140 135 - 145 mmol/L    Potassium 3.7 3.4 - 5.3 mmol/L    Chloride 103 98 - 107 mmol/L    Carbon Dioxide (CO2) 26 22 - 29 mmol/L    Anion Gap 11 7 - 15 mmol/L    Urea Nitrogen 24.1 (H) 8.0 - 23.0 mg/dL    Creatinine 1.13 (H) 0.51 - 0.95 mg/dL    GFR Estimate 55 (L) >60 mL/min/1.73m2    Calcium 9.3 8.8 - 10.4 mg/dL    Glucose 104 (H) 70 - 99 mg/dL   CBC with platelets and differential   Result Value Ref Range    WBC Count 7.3 4.0 - 11.0 10e3/uL    RBC Count 4.18 3.80 - 5.20 10e6/uL    Hemoglobin 11.8 11.7 - 15.7 g/dL    Hematocrit 35.6 35.0 - 47.0 %    MCV 85 78 - 100 fL    MCH 28.2 26.5 - 33.0 pg    MCHC 33.1 31.5 - 36.5 g/dL    RDW 12.6 10.0 - 15.0 %    Platelet Count 206 150 - 450 10e3/uL    % Neutrophils 65 %    % Lymphocytes 23 %    % Monocytes 7 %    % Eosinophils 4 %    % Basophils 1 %    % Immature Granulocytes 0 %    NRBCs per 100 WBC 0 <1 /100    Absolute Neutrophils 4.7 1.6 - 8.3 10e3/uL    Absolute Lymphocytes 1.7 0.8 - 5.3 10e3/uL    Absolute Monocytes 0.5 0.0 - 1.3 10e3/uL    Absolute Eosinophils 0.3 0.0 - 0.7 10e3/uL    Absolute Basophils 0.1 0.0 - 0.2 10e3/uL    Absolute Immature Granulocytes 0.0 <=0.4 10e3/uL    Absolute NRBCs 0.0 10e3/uL   Symptomatic Influenza A/B, RSV, & SARS-CoV2 PCR (COVID-19) Nasopharyngeal    Specimen: Nasopharyngeal; Swab   Result Value Ref Range    Influenza A PCR Negative Negative    Influenza B PCR Negative Negative    RSV PCR Negative Negative    SARS CoV2 PCR Negative Negative   Result Value Ref Range    Magnesium 2.6 (H) 1.7 - 2.3 mg/dL   TSH with free T4 reflex   Result Value Ref Range    TSH 2.00 0.30 -  4.20 uIU/mL       RADIOLOGY:  Reviewed all pertinent imaging. Please see official radiology report.  No orders to display         EKG:    Reviewed and interpreted as: ECG (reviewed and interpreted):  Performed at 10-Sep-2024 15:37:03  HR: 58 bpm  Sinus bradycardia. Rhythm without ST abnormalities.   Morphologically similar to prior ECG 7/19/24.  I have independently reviewed and interpreted the EKG(s) documented above.        I, Aakash Muniz, am serving as a scribe to document services personally performed by Dr. Laura Deras based on my observation and the provider's statements to me. I, Laura Deras MD attest that Aakash Muniz is acting in a scribe capacity, has observed my performance of the services and has documented them in accordance with my direction.       Laura Deras MD  09/10/24 9930

## 2024-09-10 NOTE — ED TRIAGE NOTES
"Through [facility] :   Pt here with intermittent throbbing headache and intermittent dizziness for past two weeks. Describes the dizziness as \"seeing stars\" and makes her \"feel like she might faint.\" Pt was slightly unsteady when walking into the triage area. Denies CP, SOB, abdominal pain.   Hx of htn and back pain but is compliant with medications.      Triage Assessment (Adult)       Row Name 09/10/24 1536          Triage Assessment    Airway WDL WDL        Respiratory WDL    Respiratory WDL WDL        Skin Circulation/Temperature WDL    Skin Circulation/Temperature WDL WDL        Cardiac WDL    Cardiac WDL WDL        Peripheral/Neurovascular WDL    Peripheral Neurovascular WDL WDL        Cognitive/Neuro/Behavioral WDL    Cognitive/Neuro/Behavioral WDL WDL                     "

## 2024-09-30 ENCOUNTER — TELEPHONE (OUTPATIENT)
Dept: NEUROLOGY | Facility: CLINIC | Age: 59
End: 2024-09-30
Payer: COMMERCIAL

## 2024-09-30 LAB
ATRIAL RATE - MUSE: 58 BPM
DIASTOLIC BLOOD PRESSURE - MUSE: NORMAL MMHG
INTERPRETATION ECG - MUSE: NORMAL
P AXIS - MUSE: 36 DEGREES
PR INTERVAL - MUSE: 190 MS
QRS DURATION - MUSE: 100 MS
QT - MUSE: 456 MS
QTC - MUSE: 447 MS
R AXIS - MUSE: 58 DEGREES
SYSTOLIC BLOOD PRESSURE - MUSE: NORMAL MMHG
T AXIS - MUSE: 55 DEGREES
VENTRICULAR RATE- MUSE: 58 BPM

## 2024-09-30 NOTE — TELEPHONE ENCOUNTER
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 No changes to our plan, from my standpoint, and we will continue as planned. Would like her to schedule PT if not already done so.   Let me know if you have any questions.    Thanks,   Georgette Ornelas MD  Department of Physical Medicine & Rehabilitation  ----- Message -----  From: Stephany Villa Ib  Sent: 8/30/2024   4:40 PM CDT  To: Georgette Ornelas MD

## 2024-09-30 NOTE — TELEPHONE ENCOUNTER
Called and spoke with patient via . Relayed the message below. Patient has not scheduled the PT, so writer gave her the phone number to schedule again. Asked her to call back with any further concerns.

## 2024-10-10 NOTE — TELEPHONE ENCOUNTER
RECORDS RECEIVED FROM: internal/[facility]   DATE RECEIVED: 10/22/24   NOTES (FOR ALL VISITS) STATUS DETAILS   OFFICE NOTES from referring provider Internal 7/8/24 Jeremy Coreas NP   MEDICATION LIST Internal    IMAGING      CT (HEAD/NECK/CHEST/ABDOMEN) Internal 6/10/22 CT abd pelvis   LABS     DIABETES: HBGA1C, CREATININE, FASTING LIPIDS, MICROALBUMIN URINE, POTASSIUM, TSH, T4    THYROID: TSH, T4, CBC, THYRODLONULIN, TOTAL T3, FREE T4, CALCITONIN, CEA Internal   9/10/24 cbc  9/10/24 tsh w free t4

## 2024-10-22 ENCOUNTER — PRE VISIT (OUTPATIENT)
Dept: ENDOCRINOLOGY | Facility: CLINIC | Age: 59
End: 2024-10-22

## 2024-11-06 ENCOUNTER — THERAPY VISIT (OUTPATIENT)
Dept: PHYSICAL THERAPY | Facility: CLINIC | Age: 59
End: 2024-11-06
Attending: PHYSICAL MEDICINE & REHABILITATION
Payer: COMMERCIAL

## 2024-11-06 DIAGNOSIS — M54.50 CHRONIC BILATERAL LOW BACK PAIN WITHOUT SCIATICA: Primary | ICD-10-CM

## 2024-11-06 DIAGNOSIS — M47.816 LUMBAR SPONDYLOSIS: ICD-10-CM

## 2024-11-06 DIAGNOSIS — G89.29 CHRONIC BILATERAL LOW BACK PAIN WITHOUT SCIATICA: Primary | ICD-10-CM

## 2024-11-06 PROCEDURE — 97161 PT EVAL LOW COMPLEX 20 MIN: CPT | Mod: GP | Performed by: PHYSICAL THERAPIST

## 2024-11-06 PROCEDURE — 97110 THERAPEUTIC EXERCISES: CPT | Mod: GP | Performed by: PHYSICAL THERAPIST

## 2024-11-06 PROCEDURE — 97140 MANUAL THERAPY 1/> REGIONS: CPT | Mod: GP | Performed by: PHYSICAL THERAPIST

## 2024-11-06 NOTE — PROGRESS NOTES
PHYSICAL THERAPY EVALUATION  Type of Visit: Evaluation        Fall Risk Screen:  Fall screen completed by: PT  Have you fallen 2 or more times in the past year?: No  Have you fallen and had an injury in the past year?: No  Is patient a fall risk?: No    Subjective         Presenting condition or subjective complaint: bilat LBP.  Insidious onset.  Sx for about 10 years.  Date of onset: 11/06/14 (approx)    Relevant medical history:     Dates & types of surgery:      Prior diagnostic imaging/testing results:     x-ray - Normal vertebral heights and alignment. Mild-to-moderate multilevel disc height loss and facet hypertrophy is similar prior, greatest at L5-S1   Prior therapy history for the same diagnosis, illness or injury: No      Prior Level of Function  Transfers: Independent  Ambulation: Independent  ADL: Independent  IADL:     Living Environment  Social support: With family members   Type of home: House   Stairs to enter the home: No       Ramp:     Stairs inside the home: Yes       Help at home: None  Equipment owned:       Employment: No    Hobbies/Interests:      Patient goals for therapy:      Pain assessment:      Objective   LUMBAR SPINE EVALUATION  PAIN: Pain Level at Rest: 1/10  Pain Level with Use: 7/10  Pain Location: lumbar spine  Pain Quality: Sharp and spicy  Pain Frequency: constant  Pain is Worst: daytime or nighttime  Pain is Exacerbated By: constant pain;  sweeping, standing  Pain is Relieved By: none  Pain Progression: Unchanged  INTEGUMENTARY (edema, incisions):   POSTURE: WNL  GAIT:   Weightbearing Status:   Assistive Device(s):   Gait Deviations: WNL  BALANCE/PROPRIOCEPTION:   WEIGHTBEARING ALIGNMENT:   NON-WEIGHTBEARING ALIGNMENT:    ROM:   (Degrees) Left AROM Left PROM  Right AROM Right PROM   Hip Flexion       Hip Extension       Hip Abduction       Hip Adduction       Hip Internal Rotation       Hip External Rotation       Knee Flexion       Knee Extension       Lumbar Side glide      Lumbar Flexion Hands to knees with pain   Lumbar Extension Decreased 25%   Pain:   End feel:   PELVIC/SI SCREEN:   STRENGTH:  fair trunk strength     MYOTOMES: WNL  DTR S: WNL  CORD SIGNS:   DERMATOMES: WNL  NEURAL TENSION:   FLEXIBILITY:   LUMBAR/HIP Special Tests:    Left Right   WHITNEY Negative  Negative    FADIR/Labrum/ALLIE Negative  Negative    Femoral Nerve     Manuel's     Piriformis     Quadrant Testing     SLR Negative  Negative    Slump Negative  Negative    Stork with Extension     Deion             PELVIS/SI SPECIAL TESTS:   FUNCTIONAL TESTS:   PALPATION: denies tenderness  SPINAL SEGMENTAL CONCLUSIONS:  hypo in lumbar spine      Assessment & Plan   CLINICAL IMPRESSIONS  Medical Diagnosis: lumbar spondylosis    Treatment Diagnosis: chronic LBP   Impression/Assessment: Patient is a 59 year old female with LB complaints.  The following significant findings have been identified: Pain, Decreased ROM/flexibility, Decreased joint mobility, Decreased strength, Impaired muscle performance, and Decreased activity tolerance. These impairments interfere with their ability to perform self care tasks, recreational activities, household chores, driving , household mobility, and community mobility as compared to previous level of function.     Clinical Decision Making (Complexity):  Clinical Presentation: Stable/Uncomplicated  Clinical Presentation Rationale: based on medical and personal factors listed in PT evaluation  Clinical Decision Making (Complexity): Low complexity    PLAN OF CARE  Treatment Interventions:  Interventions: Manual Therapy, Neuromuscular Re-education, Therapeutic Activity, Therapeutic Exercise    Long Term Goals     PT Goal 1  Goal Identifier: LBP  Goal Description: pt able to stand x 30 min with 2/10 pain  Rationale: to maximize safety and independence with performance of ADLs and functional tasks;to maximize safety and independence within the home;to maximize safety and independence within the  community;to maximize safety and independence with transportation;to maximize safety and independence with self cares  Target Date: 02/03/25      Frequency of Treatment: 1x/ weeks  Duration of Treatment: 12 weeks    Recommended Referrals to Other Professionals:   Education Assessment:   Learner/Method: Patient;Family;Demonstration;Pictures/Video    Risks and benefits of evaluation/treatment have been explained.   Patient/Family/caregiver agrees with Plan of Care.     Evaluation Time:     PT Eval, Low Complexity Minutes (66324): 15       Signing Clinician: WARREN Thomas [facility]                                                                                   OUTPATIENT PHYSICAL THERAPY      PLAN OF TREATMENT FOR OUTPATIENT REHABILITATION   Patient's Last Name, First Name, Anat Contreras YOB: 1965   Provider's Name   [facility]   Medical Record No.  [id number]     Onset Date: 11/06/14 (approx)  Start of Care Date: 11/06/24     Medical Diagnosis:  lumbar spondylosis      PT Treatment Diagnosis:  chronic LBP Plan of Treatment  Frequency/Duration: 1x/ weeks/ 12 weeks    Certification date from 11/06/24 to 02/03/25         See note for plan of treatment details and functional goals     Deion Crowder, PT                         I CERTIFY THE NEED FOR THESE SERVICES FURNISHED UNDER        THIS PLAN OF TREATMENT AND WHILE UNDER MY CARE     (Physician attestation of this document indicates review and certification of the therapy plan).              Referring Provider:  Georgette Ornelas    Initial Assessment  See Epic Evaluation- Start of Care Date: 11/06/24

## 2024-11-14 ENCOUNTER — THERAPY VISIT (OUTPATIENT)
Dept: PHYSICAL THERAPY | Facility: CLINIC | Age: 59
End: 2024-11-14
Attending: PHYSICAL MEDICINE & REHABILITATION
Payer: COMMERCIAL

## 2024-11-14 DIAGNOSIS — G89.29 CHRONIC BILATERAL LOW BACK PAIN WITHOUT SCIATICA: Primary | ICD-10-CM

## 2024-11-14 DIAGNOSIS — M54.50 CHRONIC BILATERAL LOW BACK PAIN WITHOUT SCIATICA: Primary | ICD-10-CM

## 2024-11-14 PROCEDURE — 97110 THERAPEUTIC EXERCISES: CPT | Mod: GP | Performed by: PHYSICAL THERAPIST

## 2024-11-14 PROCEDURE — 97140 MANUAL THERAPY 1/> REGIONS: CPT | Mod: GP | Performed by: PHYSICAL THERAPIST

## 2024-11-20 ENCOUNTER — THERAPY VISIT (OUTPATIENT)
Dept: PHYSICAL THERAPY | Facility: CLINIC | Age: 59
End: 2024-11-20
Attending: PHYSICAL MEDICINE & REHABILITATION
Payer: COMMERCIAL

## 2024-11-20 DIAGNOSIS — G89.29 CHRONIC BILATERAL LOW BACK PAIN WITHOUT SCIATICA: Primary | ICD-10-CM

## 2024-11-20 DIAGNOSIS — M54.50 CHRONIC BILATERAL LOW BACK PAIN WITHOUT SCIATICA: Primary | ICD-10-CM

## 2024-11-20 PROCEDURE — 97140 MANUAL THERAPY 1/> REGIONS: CPT | Mod: GP | Performed by: PHYSICAL THERAPIST

## 2024-11-20 PROCEDURE — 97110 THERAPEUTIC EXERCISES: CPT | Mod: GP | Performed by: PHYSICAL THERAPIST
